# Patient Record
Sex: MALE | NOT HISPANIC OR LATINO | URBAN - METROPOLITAN AREA
[De-identification: names, ages, dates, MRNs, and addresses within clinical notes are randomized per-mention and may not be internally consistent; named-entity substitution may affect disease eponyms.]

---

## 2017-02-08 ENCOUNTER — EMERGENCY (EMERGENCY)
Facility: HOSPITAL | Age: 47
LOS: 1 days | Discharge: HOME | End: 2017-02-08
Attending: EMERGENCY MEDICINE | Admitting: EMERGENCY MEDICINE
Payer: COMMERCIAL

## 2017-02-08 VITALS
SYSTOLIC BLOOD PRESSURE: 153 MMHG | DIASTOLIC BLOOD PRESSURE: 90 MMHG | TEMPERATURE: 98 F | OXYGEN SATURATION: 99 % | HEART RATE: 85 BPM | RESPIRATION RATE: 18 BRPM

## 2017-02-08 LAB
APPEARANCE UR: CLEAR — SIGNIFICANT CHANGE UP
BILIRUB UR-MCNC: NEGATIVE — SIGNIFICANT CHANGE UP
BLOOD UR QL VISUAL: NEGATIVE — SIGNIFICANT CHANGE UP
COLOR SPEC: YELLOW — SIGNIFICANT CHANGE UP
GLUCOSE UR-MCNC: NEGATIVE — SIGNIFICANT CHANGE UP
KETONES UR-MCNC: NEGATIVE — SIGNIFICANT CHANGE UP
LEUKOCYTE ESTERASE UR-ACNC: NEGATIVE — SIGNIFICANT CHANGE UP
MUCOUS THREADS # UR AUTO: SIGNIFICANT CHANGE UP
NITRITE UR-MCNC: NEGATIVE — SIGNIFICANT CHANGE UP
NON-SQ EPI CELLS # UR AUTO: <1 — SIGNIFICANT CHANGE UP
PH UR: 6 — SIGNIFICANT CHANGE UP (ref 4.6–8)
PROT UR-MCNC: 20 — SIGNIFICANT CHANGE UP
RBC CASTS # UR COMP ASSIST: SIGNIFICANT CHANGE UP (ref 0–?)
SP GR SPEC: 1.03 — SIGNIFICANT CHANGE UP (ref 1–1.03)
SQUAMOUS # UR AUTO: SIGNIFICANT CHANGE UP
UROBILINOGEN FLD QL: 1 E.U. — SIGNIFICANT CHANGE UP (ref 0.1–0.2)
WBC UR QL: SIGNIFICANT CHANGE UP (ref 0–?)

## 2017-02-08 PROCEDURE — 71020: CPT | Mod: 26

## 2017-02-08 PROCEDURE — 93010 ELECTROCARDIOGRAM REPORT: CPT

## 2017-02-08 PROCEDURE — 99284 EMERGENCY DEPT VISIT MOD MDM: CPT | Mod: 25

## 2017-02-08 RX ORDER — ACETAMINOPHEN 500 MG
975 TABLET ORAL ONCE
Qty: 0 | Refills: 0 | Status: COMPLETED | OUTPATIENT
Start: 2017-02-08 | End: 2017-02-08

## 2017-02-08 RX ORDER — ACETAMINOPHEN 500 MG
1000 TABLET ORAL ONCE
Qty: 0 | Refills: 0 | Status: DISCONTINUED | OUTPATIENT
Start: 2017-02-08 | End: 2017-02-08

## 2017-02-08 RX ADMIN — Medication 975 MILLIGRAM(S): at 02:24

## 2017-02-08 NOTE — ED PROVIDER NOTE - PHYSICAL EXAMINATION
Right latissimus pain reproducible with palpation and with postural change. Mid back paraspinal reproducible ttp and with position change and trunk movements. no spinal ttp  no LE edema, normal equal distal pulses.

## 2017-02-08 NOTE — ED PROVIDER NOTE - OBJECTIVE STATEMENT
46 YOM no PMH p/w 1 week of right latissimus pain worse with movement and SOB for one day.  Pt took 3 Motrin and Robaxin for his pain without relief.  Pain reproducible with movement. Pt also complained of dysuria and increased frequency.  No fevers, chills, sweats, weight loss, fatigue, or malaise. No chest pain, shortness of breath, radiation to the arm, jaw, neck or back, worsening symptoms with exertion, headache, nausea, or vomiting.  No history of stress test, angiogram, echocardiogram, or follow up with a cardiologist.  No personal history of coronary artery disease, arrhythmia, or CHF.  No family history of coronary artery disease, sudden cardiac death, or arrhythmia.  No smoking.  PERC: HR<100, Age<50, SAT>95%, No prior Hx of PE, Trauma Surg. in 4 weeks, Hemoptysis, Exogenous Estrogen,or Unilateral Leg Swelling. 46 YOM PMH HTN p/w 1 week of R mid to lower back pain and MAURER. Has the back pain x1w, intially intermittent now constant for 2d, relief initially w/ motrin but less relief since last dose at 2000 and Robaxin. Pain reproducible with movement and certain positions. Also c/o minimal dysuria and increased frequency x1d. Also 1d of cough and minimal MAURER, no SOb at rest. No fevers, chills, sweats, weight loss, fatigue, or malaise, chest pain, NVD, LE swelling, black/bloody stool. No family history of coronary artery disease, sudden cardiac death, or arrhythmia.  No smoking.  PERC: HR<100, Age<50, SAT>95%, No prior Hx of PE, Trauma Surg. in 4 weeks, Hemoptysis, Exogenous Estrogen,or Unilateral Leg Swelling.

## 2017-02-08 NOTE — ED PROVIDER NOTE - PROGRESS NOTE DETAILS
JW EKG unremarkable.  No signs of CHF, HCT>30%, no sign of Brugada Syndrome, Long QT, Short QT, WPW, HOCM, electrolyte abnormality, ischemia, arrhythmia, no SOB, SBP >90mmHg. Pt not pregnant. Pt feels better with volume resuscitation.  CXR unremarkable.  I reviewed the alarm symptoms of this patient's diagnosis and discussed criteria for their return to the emergency department.  I instructed the patient to return to the emergency department with any alarm symptoms for their specific diagnosis including chest pain, SOB, dizziness, any worsening symptoms, and any other concerns.  I instructed this patient to call their primary doctor today, to inform them of their visit to the emergency department, and to obtain a repeat evaluation in the next 24 hours.  This patient understood and agreed with our plan for follow up and felt safe returning home.  At the time of discharge this patient remained in stable condition, in no acute distress, with stable vital signs. UA and CXR unremarkable.  Pt feels better with medication.  EKG no signs of active ischemia.  Repeat examination no focal findings.  I reviewed the alarm symptoms of this patient's diagnosis and discussed criteria for their return to the emergency department.  I instructed the patient to return to the emergency department with any alarm symptoms for their specific diagnosis including chest pain, SOB, any worsening symptoms, and any other concerns.  I instructed this patient to call their primary doctor today, to inform them of their visit to the emergency department, and to obtain a repeat evaluation in the next 24 hours.  This patient understood and agreed with our plan for follow up and felt safe returning home.  At the time of discharge this patient remained in stable condition, in no acute distress, with stable vital signs. Klepfish: prelim xr wnl.

## 2017-02-08 NOTE — ED PROVIDER NOTE - ATTENDING CONTRIBUTION TO CARE
46M PMH HTN p/w back pain x12 worse w/ movment, relief w/ motrin, no associated neurovascular complaints. Also slight dysuria. Also slight MAURER and cough x1d. Vitals wnl, exam notable for well appearing, reproducible back ttp, neurovascularly intact. EKG wnl.  Back pain: Likely MSK. No neruological complaints or deficits. Pain control, no indication for further ED w/u. Tylenol in ED, escript for perc (pt driving home).  Dysuria: Eval for UTI.  MAURER/cough: Possible developing URI. PERC neg. Clinically not PE or unstable angina. Will check CXR. Has pmd for easy outpt f/u.

## 2017-02-08 NOTE — ED PROVIDER NOTE - MEDICAL DECISION MAKING DETAILS
46 YOM no PMH p/w 1 week of right latissimus pain worse with movement and SOB for one day. Exam reveals reproducible back pain.  No other findings.  PERC negative.  EKG no signs of arrhythmia or ischemia.  No chest pain or SOB.  Will evaluate for UTI, TX symptomatically for back pain, reassess.

## 2017-02-08 NOTE — ED ADULT TRIAGE NOTE - CHIEF COMPLAINT QUOTE
Pt c/o right sided back pain not relieved with pain medication. Also c/o shortness of breath when performing activities x a few days. Denies chest pain.

## 2017-02-09 LAB
BACTERIA UR CULT: SIGNIFICANT CHANGE UP
SPECIMEN SOURCE: SIGNIFICANT CHANGE UP

## 2017-09-14 PROBLEM — Z00.00 ENCOUNTER FOR PREVENTIVE HEALTH EXAMINATION: Status: ACTIVE | Noted: 2017-09-14

## 2017-11-06 ENCOUNTER — APPOINTMENT (OUTPATIENT)
Dept: INTERNAL MEDICINE | Facility: CLINIC | Age: 47
End: 2017-11-06